# Patient Record
(demographics unavailable — no encounter records)

---

## 2019-01-01 NOTE — EDPHYS
Physician Documentation                                                                           

 [facility]                                                                 

Name: Yaron Steinberg                                                                              

Age: 13 days                                                                                      

Sex: Male                                                                                         

: 2019                                                                                   

MRN: [id number]                                                                                   

Arrival Date: 2019                                                                          

Time: 02:26                                                                                       

Account#: [id number]                                                                            

Bed 2                                                                                             

Private MD:                                                                                       

ED Physician James Schumacher                                                                       

HPI:                                                                                              

                                                                                             

04:32 This 13 days old Male presents to ER via Carried with complaints of NOT FULLY AWAKE,    gs  

      ROLLING EYE BACK.                                                                           

04:32 The patient presents to the emergency department with LETHARGIC NO FEEDING WELL VOMIT X gs  

      1, SEEN PCP  BILI 11. Onset: The symptoms/episode began/occurred yesterday.             

      Associated signs and symptoms: Pertinent negatives: fever. Modifying factors: The           

      patient symptoms are alleviated by nothing, the patient symptoms are aggravated by          

      nothing. The patient has not experienced similar symptoms in the past.                      

                                                                                                  

Historical:                                                                                       

- Allergies:                                                                                      

02:40 No Known Allergies;                                                                     bb  

- Home Meds:                                                                                      

02:40 None [Active];                                                                          bb  

- PMHx:                                                                                           

02:40 None;                                                                                   bb  

- PSHx:                                                                                           

02:40 None;                                                                                   bb  

                                                                                                  

- Immunization history:: Childhood immunizations are up to date.                                  

- Social history:: The patient lives at home.                                                     

- Ebola Screening: : No symptoms or risks identified at this time.                                

                                                                                                  

                                                                                                  

ROS:                                                                                              

04:32 All other systems are negative.                                                         gs  

                                                                                                  

Exam:                                                                                             

04:32 Head/Face:  Normocephalic, atraumatic, fontanelle open, soft, and flat.                 gs  

04:32 Eyes:  Pupils equal round and reactive to light, extra-ocular motions intact.  Lids and     

      lashes normal.  Conjunctiva and sclera are non-icteric and not injected.  Cornea within     

      normal limits.  Periorbital areas with no swelling, redness, or edema. ENT:  Nares          

      patent. No nasal discharge, no septal abnormalities noted.  Tympanic membranes are          

      normal and external auditory canals are clear.  Oropharynx with no redness, swelling,       

      or masses, exudates, or evidence of obstruction, uvula midline.  Mucous membranes           

      moist. Neck:  Trachea midline with no masses and no lymphadenopathy.  No nuchal             

      rigidity.  No Meningismus. Chest/axilla:  Normal symmetrical motion.  No tenderness.        

      No crepitus.  No axillary masses or tenderness. Cardiovascular:  Regular rate and           

      rhythm with a normal S1 and S2.  No gallops, murmurs, or rubs.  Normal PMI, no JVD.  No     

      pulse deficits. Respiratory:  Lungs have equal breath sounds bilaterally, clear to          

      auscultation and percussion.  No rales, rhonchi or wheezes noted.  No increased work of     

      breathing, no retractions or nasal flaring. Abdomen/GI:  Soft, non-tender with normal       

      bowel sounds.  No distension, tympany or bruits.  No guarding, rebound or rigidity.  No     

      palpable masses or evidence of tenderness with thorough palpation. Back:  No spinal         

      tenderness.  No costovertebral tenderness.  Full range of motion. MS/ Extremity:            

      Pulses equal, no cyanosis.  Neurovascular intact.  Full, normal range of motion. Neuro:     

       Awake, alert, with age appropriate reflexes and responses to physical exam.  Good          

      muscle tone.                                                                                

04:32 Constitutional: The patient appears awake.                                                  

04:32 Head/face: [place]: is flat and non-distended.                                           

04:32 Eyes: NOT OPENING EYES WITH STIMULATION.                                                    

04:32 Skin: Appearance: Color: jaundiced.                                                         

                                                                                                  

Vital Signs:                                                                                      

02:40 Pulse 178; Temp 98.9(R); Pulse Ox 95% on R/A; Weight 4.34 kg (M);                       bb  

02:45 Resp 46;                                                                                lp1 

04:00 Pulse 151; Resp 46; Pulse Ox 95% on R/A;                                                lp1 

05:27 Pulse 149; Resp 44; Pulse Ox 94% on R/A;                                                lp1 

05:37 BP 80 / 35;                                                                             lp1 

06:25 Pulse 146; Resp 44; Pulse Ox 97% on R/A;                                                lp1 

                                                                                                  

MDM:                                                                                              

02:40 Patient medically screened.                                                               

04:32 Differential diagnosis: HYPERBILIRUBINEMIA. Data reviewed: vital signs, nurses notes.     

      ED course: K ELEVATED FROM HEMOLYSIS OF HEEL STICK BLOOD.                                   

                                                                                                  

                                                                                             

02:41 Order name: CBC with Diff                                                                 

                                                                                             

02:41 Order name: LFT's                                                                         

                                                                                             

02:41 Order name: Basic Metabolic Panel                                                         

                                                                                             

03:08 Order name: Glucose, Ancillary Testing; Complete Time: 03:38                            EDMS

                                                                                             

03:34 Order name: CBC with Automated Diff                                                     EDMS

                                                                                             

03:50 Order name: Basic Metabolic Panel; Complete Time: 04:09                                 EDMS

                                                                                             

03:50 Order name: Liver (Hepatic) Function; Complete Time: 04:09                              EDMS

                                                                                             

04:55 Order name: CBC Smear Scan                                                              EDMS

                                                                                                  

Administered Medications:                                                                         

No medications were administered                                                                  

                                                                                                  

                                                                                                  

Point of Care Testing:                                                                            

      Blood Glucose:                                                                              

02:57 Blood Glucose: 87 mg/dL;                                                                lp1 

      Ranges:                                                                                     

      Critical Glucose Levels:Adult <50 mg/dl or >400 mg/dl  <40 mg/dl or >180 mg/dl       

Disposition:                                                                                      

19 04:39 Transfer ordered to [facility] Diagnosis is  jaundice, unspecified.   

- Reason for transfer: Higher level of care.                                                      

- Accepting physician is [facility].                                                                     

- Condition is Stable.                                                                            

- Problem is new.                                                                                 

- Symptoms are unchanged.                                                                         

                                                                                                  

                                                                                                  

                                                                                                  

Signatures:                                                                                       

Dispatcher [employer]                           EDMS                                                 

Valentine Spencer RN                     RN   bb                                                   

Keke Savage RN                         RN   lp1                                                  

James Schumacher MD MD   gs                                                   

                                                                                                  

Corrections: (The following items were deleted from the chart)                                    

06:57 04:39 2019 04:39 Transfer ordered to [facility] Diagnosis is         lp1 

      jaundice, unspecified. Reason for transfer: Higher level of care. Accepting physician       

      is [facility]. Condition is Stable. Problem is new. Symptoms are unchanged. gs                     

                                                                                                  

**************************************************************************************************

## 2019-01-01 NOTE — ER
Nurse's Notes                                                                                     

 [facility] [place] [facility]\""                                                                 

Name: Yaron Steinberg                                                                              

Age: 13 days                                                                                      

Sex: Male                                                                                         

: 2019                                                                                   

MRN: [id number]                                                                                   

Arrival Date: 2019                                                                          

Time: 02:26                                                                                       

Account#: [id number]                                                                            

Bed 2                                                                                             

Private MD:                                                                                       

Diagnosis:  jaundice, unspecified                                                         

                                                                                                  

Presentation:                                                                                     

                                                                                             

02:38 Presenting complaint: Mother states: pt has not been "fully awake" since approx 1800    bb  

      last night, normally pt opens his eyes and is looking around but they are not able to       

      get him fully awake and his eyes are rolling back in his head. He was fed a bottle of       

      breast milk at 0100 but vomited it up. Transition of care: patient was not received         

      from another setting of care. Onset of symptoms was 2019. Care prior to          

      arrival: None.                                                                              

02:38 Method Of Arrival: Carried                                                              bb  

02:38 Acuity: AURY 2                                                                           bb  

                                                                                                  

Historical:                                                                                       

- Allergies:                                                                                      

02:40 No Known Allergies;                                                                     bb  

- Home Meds:                                                                                      

02:40 None [Active];                                                                          bb  

- PMHx:                                                                                           

02:40 None;                                                                                   bb  

- PSHx:                                                                                           

02:40 None;                                                                                   bb  

                                                                                                  

- Immunization history:: Childhood immunizations are up to date.                                  

- Social history:: The patient lives at home.                                                     

- Ebola Screening: : No symptoms or risks identified at this time.                                

                                                                                                  

                                                                                                  

Screenin:47 Abuse screen: Denies threats or abuse. Denies injuries from another. Nutritional        lp1 

      screening: No deficits noted. Tuberculosis screening: No symptoms or risk factors           

      identified.                                                                                 

02:47 Pedi Fall Risk Total Score: 0-1 Points : Low Risk for Falls.                            lp1 

                                                                                                  

      Fall Risk Scale Score:                                                                      

02:47 Mobility: Unable to ambulate or transfer (0); Mentation: Developmentally appropriate    lp1 

      and alert (0); Elimination: Diapers (0); Hx of Falls: No (0); Current Meds: No (0);         

      Total Score: 0                                                                              

Assessment:                                                                                       

02:47 General: Appears in no apparent distress. Behavior is crying, weak cry noted. Pain:     lp1 

      Unable to use pain scale. Patient is a pre-verbal child. Neuro: Level of Consciousness      

      is lethargic, keeping eyes remain closed while crying . Cardiovascular: Patient's skin      

      is warm and dry. Respiratory: Airway is patent Respiratory effort is even, Breath           

      sounds are clear bilaterally. GI: Abdomen is non-distended. : Genitalia appear            

      normal. EENT: No deficits noted. Derm: Skin is intact, is thin, Skin is dry, Skin is        

      jaundiced. Musculoskeletal: No deficits noted.                                              

04:13 Reassessment: mother attempted to feed patient breast milk from bottle; mother states   lp1 

      "he just keeps spitting it up".                                                             

05:38 Reassessment: pt appears to be sleeping, resting on mother's chest, resp unlabored, no  bb  

      signs of distress noted, report called to Alva Bhatia RN at [facility].           

06:00 Reassessment: No changes from previously documented assessment. Mother and grandmother  lp1 

      aware of pending transfer.                                                                  

06:25 Reassessment:  EMS at bedside for transfer.                                           lp1 

06:39 Reassessment: Mother feeding patient via bottle prior to transfer.                      lp1 

                                                                                                  

Vital Signs:                                                                                      

02:40 Pulse 178; Temp 98.9(R); Pulse Ox 95% on R/A; Weight 4.34 kg (M);                       bb  

02:45 Resp 46;                                                                                lp1 

04:00 Pulse 151; Resp 46; Pulse Ox 95% on R/A;                                                lp1 

05:27 Pulse 149; Resp 44; Pulse Ox 94% on R/A;                                                lp1 

05:37 BP 80 / 35;                                                                             lp1 

06:25 Pulse 146; Resp 44; Pulse Ox 97% on R/A;                                                lp1 

                                                                                                  

ED Course:                                                                                        

02:26 Patient arrived in ED.                                                                  ag3 

02:34 James Schumacher MD is Attending Physician.                                              gs  

02:40 Triage completed.                                                                       bb  

02:40 Arm band placed on Patient placed in a hallway bed, on a stretcher, on pulse oximetry.  bb  

      Family accompanied patient.                                                                 

02:46 Child being held by parent. Pulse ox on.                                                lp1 

03:04 Keke Savage, RN is Primary Nurse.                                                       lp1 

03:48 Notified ED physician of a critical lab result(s). total bili of 11.1 and potassium of  fc  

      6.4.                                                                                        

06:25 No provider procedures requiring assistance completed. Patient did not have IV access   lp1 

      during this emergency room visit.                                                           

                                                                                                  

Administered Medications:                                                                         

No medications were administered                                                                  

                                                                                                  

                                                                                                  

Point of Care Testing:                                                                            

      Blood Glucose:                                                                              

02:57 Blood Glucose: 87 mg/dL;                                                                lp1 

      Ranges:                                                                                     

                                                                                                  

Outcome:                                                                                          

04:39 ER care complete, transfer ordered by MD.                                               gs  

06:25 Condition: stable                                                                       lp1 

06:25 Instructed on the need for transfer.                                                        

06:34 Transferred by ground EMS to [facility], Transfer form          lp1 

      completed. X-rays sent w/ patient.                                                          

06:57 Patient left the ED.                                                                    lp1 

                                                                                                  

Signatures:                                                                                       

Magi Jackson, RN                   RN   Valentine Maravilla RN                     RN   Keke Han RN RN   lp1                                                  

James Schumacher MD MD gs Gomez, Alice ag3                                                  

                                                                                                  

**************************************************************************************************